# Patient Record
Sex: MALE | Race: WHITE | NOT HISPANIC OR LATINO | URBAN - METROPOLITAN AREA
[De-identification: names, ages, dates, MRNs, and addresses within clinical notes are randomized per-mention and may not be internally consistent; named-entity substitution may affect disease eponyms.]

---

## 2022-01-02 ENCOUNTER — LAB VISIT (OUTPATIENT)
Dept: PRIMARY CARE CLINIC | Facility: OTHER | Age: 27
End: 2022-01-02
Attending: INTERNAL MEDICINE
Payer: OTHER GOVERNMENT

## 2022-01-02 DIAGNOSIS — Z20.822 ENCOUNTER FOR LABORATORY TESTING FOR COVID-19 VIRUS: ICD-10-CM

## 2022-01-02 PROCEDURE — U0003 INFECTIOUS AGENT DETECTION BY NUCLEIC ACID (DNA OR RNA); SEVERE ACUTE RESPIRATORY SYNDROME CORONAVIRUS 2 (SARS-COV-2) (CORONAVIRUS DISEASE [COVID-19]), AMPLIFIED PROBE TECHNIQUE, MAKING USE OF HIGH THROUGHPUT TECHNOLOGIES AS DESCRIBED BY CMS-2020-01-R: HCPCS | Performed by: INTERNAL MEDICINE

## 2022-01-05 DIAGNOSIS — U07.1 COVID-19 VIRUS DETECTED: ICD-10-CM

## 2022-01-05 LAB
SARS-COV-2 RNA RESP QL NAA+PROBE: DETECTED
TEST PERFORMANCE INFO SPEC: ABNORMAL

## 2024-08-22 ENCOUNTER — TELEPHONE (OUTPATIENT)
Dept: FAMILY MEDICINE | Facility: CLINIC | Age: 29
End: 2024-08-22
Payer: COMMERCIAL

## 2024-08-22 NOTE — TELEPHONE ENCOUNTER
Call placed to number indicated in the attached message below.  Spoke to patient's mother (Esperanza) who states she contacted central scheduling to request an appointment for patient to establish care with a PCP.   Mrs. Mata states she was offered an appointment with a nurse practitioner, but advised she would like to schedule with a medical doctor to establish care.   Mrs. Mata stated she was then told someone will reach out to her to assist with scheduling an appointment.  Mrs. Mata states she was not told the location of the clinic that would be calling or the provider's name that patient would potentially be scheduling with.  Writer advised the message was sent to Dr. Nunez's staff for assistance with scheduling.  Advised of the address for the clinic.  Also advised patient currently does not have any insurance listed on file, would need to have insurance updated to confirm if clinic is in network with patient's benefits plan.  Advised Mrs. Mata she could call [phone] for assistance with updating insurance or can bring card to office and registration will assist with updating this information. Advised writer is on the medical side of patient care and insurance updates are handled by registration.  Advised writer does not have the ability or knowledge of how to update insurance as this is handled by registration.  Mrs. Maat advised she would call to have insurance update or drop by the office to update this information.  Conversation was witnessed by clinic supervisor Elizabeth Siegel who was able to hear both side of conversation as call was performed on speaker phone.

## 2024-08-22 NOTE — TELEPHONE ENCOUNTER
Marjorie Em Alex Staff     ----- Message from Marjorie Em sent at 8/22/2024 12:08 PM CDT -----  Regarding: sooner apt eca  Contact: pt  Type:  Sooner Appointment Request    Caller is requesting a sooner appointment.  Caller declined first available appointment listed below.  Caller will not accept being placed on the waitlist and is requesting a message be sent to doctor.    Name of Caller:  patient mom   When is the first available appointment?    Symptoms:  eca - annual   Would the patient rather a call back or a response via [facility]?   Best Call Back Number:  [phone]    Additional Information:  call to be seen thanks

## 2024-08-23 ENCOUNTER — OFFICE VISIT (OUTPATIENT)
Dept: FAMILY MEDICINE | Facility: CLINIC | Age: 29
End: 2024-08-23
Payer: COMMERCIAL

## 2024-08-23 VITALS
DIASTOLIC BLOOD PRESSURE: 80 MMHG | OXYGEN SATURATION: 98 % | SYSTOLIC BLOOD PRESSURE: 124 MMHG | HEIGHT: 73 IN | WEIGHT: 196.38 LBS | TEMPERATURE: 98 F | BODY MASS INDEX: 26.03 KG/M2 | HEART RATE: 67 BPM

## 2024-08-23 DIAGNOSIS — Z90.49 HISTORY OF APPENDECTOMY: ICD-10-CM

## 2024-08-23 DIAGNOSIS — Z72.89 ENGAGES IN VAPING: ICD-10-CM

## 2024-08-23 DIAGNOSIS — H61.23 BILATERAL IMPACTED CERUMEN: ICD-10-CM

## 2024-08-23 DIAGNOSIS — J34.2 DEVIATED SEPTUM: ICD-10-CM

## 2024-08-23 DIAGNOSIS — Z00.00 PHYSICAL EXAM: Primary | ICD-10-CM

## 2024-08-23 PROCEDURE — 99999 PR PBB SHADOW E&M-EST. PATIENT-LVL IV: CPT | Mod: PBBFAC,,, | Performed by: FAMILY MEDICINE

## 2024-08-23 NOTE — PATIENT INSTRUCTIONS
Gulshan Arvizu PA-C  [address] [facility]  [address] [place] [id number]  Phone: [phone]  Fax: [phone]

## 2024-08-24 NOTE — PROGRESS NOTES
Subjective:       Patient ID: Masood Diaz is a 29 y.o. male.    Chief Complaint: Deviated septum (Pt states over several years his family has noticed snoring has worsen  )    Here for new patient physical.      Social history quit smoking.  But vapes.  Alcohol about 6 beers per weekend.  Works as a .  Does exercise quite a bit.    Family history father skin cancer sister skin cancer uncle colon cancer mother healthy.    Immunizations tetanus diptheria shot about 2 years ago after an injury.  A cut on his wrist.    Past medical history.  Vaping.  Appendectomy at age 12.  BMI of 26.        Review of Systems   Constitutional: Negative.    HENT: Negative.          Snoring.  And possible apneas.  Septum is deviated can not breathe out of the left nostril.   Eyes: Negative.    Respiratory: Negative.     Cardiovascular: Negative.    Gastrointestinal: Negative.    Endocrine: Negative.    Genitourinary: Negative.    Musculoskeletal: Negative.    Skin: Negative.    Neurological: Negative.    Hematological: Negative.    Psychiatric/Behavioral: Negative.         Objective:      Physical Exam  Vitals reviewed.   Constitutional:       Appearance: Normal appearance. He is well-developed and normal weight.   HENT:      Head: Normocephalic and atraumatic.      Right Ear: Tympanic membrane normal. There is impacted cerumen.      Left Ear: Tympanic membrane normal. There is impacted cerumen.      Nose: Nose normal.      Comments: Significant asymmetry.  Left nostril smaller.  Obvious deviation of the septum.     Mouth/Throat:      Mouth: Mucous membranes are moist.      Pharynx: No oropharyngeal exudate.   Eyes:      Conjunctiva/sclera: Conjunctivae normal.      Pupils: Pupils are equal, round, and reactive to light.   Neck:      Vascular: No carotid bruit.   Cardiovascular:      Rate and Rhythm: Normal rate and regular rhythm.      Pulses: Normal pulses.      Heart sounds: Normal heart sounds. No murmur  heard.     No gallop.   Pulmonary:      Effort: Pulmonary effort is normal.      Breath sounds: Normal breath sounds.   Abdominal:      General: Bowel sounds are normal.      Palpations: Abdomen is soft. There is no mass.      Tenderness: There is no abdominal tenderness.   Musculoskeletal:         General: Normal range of motion.      Cervical back: Normal range of motion.   Skin:     General: Skin is warm and dry.   Neurological:      General: No focal deficit present.      Mental Status: He is alert and oriented to person, place, and time.   Psychiatric:         Mood and Affect: Mood normal.         Behavior: Behavior normal.         Assessment:       1. Physical exam    2. Deviated septum    3. BMI 26.0-26.9,adult    4. History of appendectomy    5. Engages in vaping        Plan:       Physical exam  -     CBC Auto Differential; Future; Expected date: 08/23/2024  -     Comprehensive Metabolic Panel; Future; Expected date: 08/23/2024  -     Lipid Panel; Future; Expected date: 08/23/2024    Deviated septum  -     Ambulatory referral/consult to ENT; Future; Expected date: 08/30/2024    BMI 26.0-26.9,adult    History of appendectomy    Engages in vaping    CBC CMP lipids.  Refer him to ENT regarding the nasal asymmetry.  Consider sleep study.  Ear lavage.    In addition to routine physical.  Difficulty breathing left nostril.  Septum is crooked.  Snores badly.  Possibly some witnessed apneas.  But no significant fatigue.    Physical examination.  Significant deviation of the septum to the left.  Pharynx without erythema.  Neck without adenopathy.  Chest clear.  Heart regular rate rhythm.    Impression.  Deviated septum.  Snoring.      Plan refer him to ENT see if the septal issue is the sole reason for his snoring.  If it is not then he will need a sleep study.  Also ear lavage.      Procedure note.  Bilateral cerumen impactions noted.  Patient desires removal.  Both ears lavaged clear without difficulty.  No  sequelae.  Repeat as needed.

## 2024-09-13 ENCOUNTER — OFFICE VISIT (OUTPATIENT)
Dept: OTOLARYNGOLOGY | Facility: CLINIC | Age: 29
End: 2024-09-13
Payer: COMMERCIAL

## 2024-09-13 ENCOUNTER — PATIENT MESSAGE (OUTPATIENT)
Dept: OTOLARYNGOLOGY | Facility: CLINIC | Age: 29
End: 2024-09-13

## 2024-09-13 VITALS
WEIGHT: 195.56 LBS | BODY MASS INDEX: 25.92 KG/M2 | DIASTOLIC BLOOD PRESSURE: 69 MMHG | HEART RATE: 66 BPM | SYSTOLIC BLOOD PRESSURE: 119 MMHG | HEIGHT: 73 IN

## 2024-09-13 DIAGNOSIS — J34.3 HYPERTROPHY OF INFERIOR NASAL TURBINATE: ICD-10-CM

## 2024-09-13 DIAGNOSIS — J34.2 DEVIATED SEPTUM: Primary | ICD-10-CM

## 2024-09-13 DIAGNOSIS — D22.5 MELANOCYTIC NEVUS OF TRUNK: ICD-10-CM

## 2024-09-13 PROCEDURE — 99999 PR PBB SHADOW E&M-EST. PATIENT-LVL IV: CPT | Mod: PBBFAC,,, | Performed by: OTOLARYNGOLOGY

## 2024-09-13 NOTE — H&P (VIEW-ONLY)
Subjective:       Patient ID: Masood Diaz is a 29 y.o. male.    Chief Complaint: Deviated Septum (Pt c/o issues breathing thru left nostril; has not seen ENT before regarding deviated septum.  Denies use of nasal sprays; denies allergy issues. )      This generally healthy 29-year-old presents with a long history basically lifelong of not breathing well out of the left side of his nose he snore some although he is not really here for that and does not think he has any problems sleeping does not think he has sleep apnea his never had a witnessed apnea events               Objective:      ENT Physical Exam    So this gentleman after discussing his problem with me just sort of looks up to the ceiling and points to his nose and it is perfectly obvious that it is septum presents into the midportion of his left nostril that is easily visible externally he tells me he can not breathe through this left side and that is apparent just without even looking closely why.      The cartilaginous caudal septum presents into the nostril and extends out beyond the columella.  Intranasally however the deviation is not as prominent it has a little deviated to the right and has some turbinate hypertrophy on the left    He asked me if I would also look at some moles on his back that has been brought to his attention by his family members who have medical positions     There has a small pedunculated not particularly problematic looking nevus just to the left of the midline between his shoulder blades I do not think that needs any attention he has a few such lesions however further down at roughly the level of T2 or T3 near the midline he has a about a 4 mm very dark pigmented nevus that I do think warrants a biopsy now that is been brought to my attention        Assessment:       1. Deviated septum    2. Hypertrophy of inferior nasal turbinate    3. Melanocytic nevus of trunk         Plan:          We discussed the options to address  this problem the nature of this deviation is not going to respond to medications that is mechanical right at the nostril and we discuss septoplasty and turbinate reduction to help with his airway, it may or may not help with the snoring and he does not think he has sleep apnea and we discussed roughly the anticipated recovery.  I have also offered to biopsy this slightly suspicious nevus on his back and I have mentioned to him that I had biopsied and a fashion that should the final pathology prove to be a melanoma it would end up needing more aggressive removal in his 2nd sitting.

## 2024-09-24 ENCOUNTER — ANESTHESIA EVENT (OUTPATIENT)
Dept: SURGERY | Facility: HOSPITAL | Age: 29
End: 2024-09-24
Payer: COMMERCIAL

## 2024-09-27 ENCOUNTER — ANESTHESIA (OUTPATIENT)
Dept: SURGERY | Facility: HOSPITAL | Age: 29
End: 2024-09-27
Payer: COMMERCIAL

## 2024-09-27 ENCOUNTER — HOSPITAL ENCOUNTER (OUTPATIENT)
Facility: HOSPITAL | Age: 29
Discharge: HOME | End: 2024-09-27
Attending: OTOLARYNGOLOGY | Admitting: OTOLARYNGOLOGY
Payer: COMMERCIAL

## 2024-09-27 PROCEDURE — 36000709 HC OR TIME LEV III EA ADD 15 MIN: Performed by: OTOLARYNGOLOGY

## 2024-09-27 PROCEDURE — 63600175 PHARM REV CODE 636 W HCPCS: Performed by: ANESTHESIOLOGY

## 2024-09-27 PROCEDURE — 25000003 PHARM REV CODE 250: Performed by: ANESTHESIOLOGY

## 2024-09-27 PROCEDURE — 25000003 PHARM REV CODE 250: Performed by: NURSE ANESTHETIST, CERTIFIED REGISTERED

## 2024-09-27 PROCEDURE — 27201423 OPTIME MED/SURG SUP & DEVICES STERILE SUPPLY: Performed by: OTOLARYNGOLOGY

## 2024-09-27 PROCEDURE — 71000039 HC RECOVERY, EACH ADD'L HOUR: Performed by: OTOLARYNGOLOGY

## 2024-09-27 PROCEDURE — 63600175 PHARM REV CODE 636 W HCPCS: Performed by: NURSE ANESTHETIST, CERTIFIED REGISTERED

## 2024-09-27 PROCEDURE — 11402 EXC TR-EXT B9+MARG 1.1-2 CM: CPT | Mod: 51,,, | Performed by: OTOLARYNGOLOGY

## 2024-09-27 PROCEDURE — 30802 ABLATE INF TURBINATE SUBMUC: CPT | Mod: 51,,, | Performed by: OTOLARYNGOLOGY

## 2024-09-27 PROCEDURE — 25000003 PHARM REV CODE 250: Performed by: OTOLARYNGOLOGY

## 2024-09-27 PROCEDURE — 37000009 HC ANESTHESIA EA ADD 15 MINS: Performed by: OTOLARYNGOLOGY

## 2024-09-27 PROCEDURE — 71000033 HC RECOVERY, INTIAL HOUR: Performed by: OTOLARYNGOLOGY

## 2024-09-27 PROCEDURE — 36000708 HC OR TIME LEV III 1ST 15 MIN: Performed by: OTOLARYNGOLOGY

## 2024-09-27 PROCEDURE — 30520 REPAIR OF NASAL SEPTUM: CPT | Mod: ,,, | Performed by: OTOLARYNGOLOGY

## 2024-09-27 PROCEDURE — 37000008 HC ANESTHESIA 1ST 15 MINUTES: Performed by: OTOLARYNGOLOGY

## 2024-09-27 RX ORDER — ROCURONIUM BROMIDE 10 MG/ML
INJECTION, SOLUTION INTRAVENOUS
Status: DISCONTINUED | OUTPATIENT
Start: 2024-09-27 | End: 2024-09-27

## 2024-09-27 RX ORDER — OXYCODONE HYDROCHLORIDE 5 MG/1
5 TABLET ORAL EVERY 4 HOURS PRN
Qty: 18 TABLET | Refills: 0 | Status: SHIPPED | OUTPATIENT
Start: 2024-09-27 | End: 2024-10-04

## 2024-09-27 RX ORDER — FENTANYL CITRATE 50 UG/ML
25 INJECTION, SOLUTION INTRAMUSCULAR; INTRAVENOUS EVERY 5 MIN PRN
Status: DISCONTINUED | OUTPATIENT
Start: 2024-09-27 | End: 2024-09-27 | Stop reason: HOSPADM

## 2024-09-27 RX ORDER — SODIUM CHLORIDE, SODIUM LACTATE, POTASSIUM CHLORIDE, CALCIUM CHLORIDE 600; 310; 30; 20 MG/100ML; MG/100ML; MG/100ML; MG/100ML
125 INJECTION, SOLUTION INTRAVENOUS CONTINUOUS
Status: DISCONTINUED | OUTPATIENT
Start: 2024-09-27 | End: 2024-09-27 | Stop reason: HOSPADM

## 2024-09-27 RX ORDER — LIDOCAINE HYDROCHLORIDE AND EPINEPHRINE 10; 10 MG/ML; UG/ML
INJECTION, SOLUTION INFILTRATION; PERINEURAL
Status: DISCONTINUED | OUTPATIENT
Start: 2024-09-27 | End: 2024-09-27 | Stop reason: HOSPADM

## 2024-09-27 RX ORDER — SULFAMETHOXAZOLE AND TRIMETHOPRIM 800; 160 MG/1; MG/1
1 TABLET ORAL 2 TIMES DAILY
Qty: 14 TABLET | Refills: 0 | Status: SHIPPED | OUTPATIENT
Start: 2024-09-27 | End: 2024-10-04

## 2024-09-27 RX ORDER — PROPOFOL 10 MG/ML
VIAL (ML) INTRAVENOUS
Status: DISCONTINUED | OUTPATIENT
Start: 2024-09-27 | End: 2024-09-27

## 2024-09-27 RX ORDER — FENTANYL CITRATE 50 UG/ML
INJECTION, SOLUTION INTRAMUSCULAR; INTRAVENOUS
Status: DISCONTINUED | OUTPATIENT
Start: 2024-09-27 | End: 2024-09-27

## 2024-09-27 RX ORDER — MIDAZOLAM HYDROCHLORIDE 1 MG/ML
INJECTION INTRAMUSCULAR; INTRAVENOUS
Status: DISCONTINUED | OUTPATIENT
Start: 2024-09-27 | End: 2024-09-27

## 2024-09-27 RX ORDER — MUPIROCIN 20 MG/G
OINTMENT TOPICAL
Status: DISCONTINUED | OUTPATIENT
Start: 2024-09-27 | End: 2024-09-27 | Stop reason: HOSPADM

## 2024-09-27 RX ORDER — ONDANSETRON HYDROCHLORIDE 2 MG/ML
INJECTION, SOLUTION INTRAVENOUS
Status: DISCONTINUED | OUTPATIENT
Start: 2024-09-27 | End: 2024-09-27

## 2024-09-27 RX ORDER — PHENYLEPHRINE HYDROCHLORIDE 10 MG/ML
INJECTION INTRAVENOUS
Status: DISCONTINUED | OUTPATIENT
Start: 2024-09-27 | End: 2024-09-27

## 2024-09-27 RX ORDER — SODIUM CHLORIDE, SODIUM LACTATE, POTASSIUM CHLORIDE, CALCIUM CHLORIDE 600; 310; 30; 20 MG/100ML; MG/100ML; MG/100ML; MG/100ML
INJECTION, SOLUTION INTRAVENOUS CONTINUOUS
Status: DISCONTINUED | OUTPATIENT
Start: 2024-09-27 | End: 2024-09-27 | Stop reason: HOSPADM

## 2024-09-27 RX ORDER — LIDOCAINE HYDROCHLORIDE 10 MG/ML
1 INJECTION, SOLUTION EPIDURAL; INFILTRATION; INTRACAUDAL; PERINEURAL ONCE
Status: COMPLETED | OUTPATIENT
Start: 2024-09-27 | End: 2024-09-27

## 2024-09-27 RX ORDER — ONDANSETRON HYDROCHLORIDE 2 MG/ML
4 INJECTION, SOLUTION INTRAVENOUS ONCE AS NEEDED
Status: DISCONTINUED | OUTPATIENT
Start: 2024-09-27 | End: 2024-09-27 | Stop reason: HOSPADM

## 2024-09-27 RX ORDER — LIDOCAINE HYDROCHLORIDE 20 MG/ML
INJECTION INTRAVENOUS
Status: DISCONTINUED | OUTPATIENT
Start: 2024-09-27 | End: 2024-09-27

## 2024-09-27 RX ORDER — DEXAMETHASONE SODIUM PHOSPHATE 4 MG/ML
INJECTION, SOLUTION INTRA-ARTICULAR; INTRALESIONAL; INTRAMUSCULAR; INTRAVENOUS; SOFT TISSUE
Status: DISCONTINUED | OUTPATIENT
Start: 2024-09-27 | End: 2024-09-27

## 2024-09-27 RX ORDER — EPHEDRINE SULFATE 50 MG/ML
INJECTION, SOLUTION INTRAVENOUS
Status: DISCONTINUED | OUTPATIENT
Start: 2024-09-27 | End: 2024-09-27

## 2024-09-27 RX ORDER — ACETAMINOPHEN 10 MG/ML
INJECTION, SOLUTION INTRAVENOUS
Status: DISCONTINUED | OUTPATIENT
Start: 2024-09-27 | End: 2024-09-27

## 2024-09-27 RX ORDER — MUPIROCIN 20 MG/G
OINTMENT TOPICAL 3 TIMES DAILY
Qty: 15 G | Refills: 0 | Status: SHIPPED | OUTPATIENT
Start: 2024-09-27 | End: 2024-10-04

## 2024-09-27 RX ORDER — OXYCODONE HYDROCHLORIDE 5 MG/1
5 TABLET ORAL ONCE AS NEEDED
Status: COMPLETED | OUTPATIENT
Start: 2024-09-27 | End: 2024-09-27

## 2024-09-27 RX ORDER — OXYMETAZOLINE HCL 0.05 %
SPRAY, NON-AEROSOL (ML) NASAL
Status: DISCONTINUED | OUTPATIENT
Start: 2024-09-27 | End: 2024-09-27 | Stop reason: HOSPADM

## 2024-09-27 RX ORDER — SUCCINYLCHOLINE CHLORIDE 20 MG/ML
INJECTION INTRAMUSCULAR; INTRAVENOUS
Status: DISCONTINUED | OUTPATIENT
Start: 2024-09-27 | End: 2024-09-27

## 2024-09-27 RX ADMIN — FENTANYL CITRATE 50 MCG: 50 INJECTION, SOLUTION INTRAMUSCULAR; INTRAVENOUS at 09:09

## 2024-09-27 RX ADMIN — ROCURONIUM BROMIDE 10 MG: 10 INJECTION, SOLUTION INTRAVENOUS at 09:09

## 2024-09-27 RX ADMIN — LIDOCAINE HYDROCHLORIDE 100 MG: 20 INJECTION, SOLUTION INTRAVENOUS at 09:09

## 2024-09-27 RX ADMIN — SODIUM CHLORIDE, POTASSIUM CHLORIDE, SODIUM LACTATE AND CALCIUM CHLORIDE: 600; 310; 30; 20 INJECTION, SOLUTION INTRAVENOUS at 09:09

## 2024-09-27 RX ADMIN — MIDAZOLAM 2 MG: 1 INJECTION INTRAMUSCULAR; INTRAVENOUS at 09:09

## 2024-09-27 RX ADMIN — SODIUM CHLORIDE, POTASSIUM CHLORIDE, SODIUM LACTATE AND CALCIUM CHLORIDE: 600; 310; 30; 20 INJECTION, SOLUTION INTRAVENOUS at 10:09

## 2024-09-27 RX ADMIN — OXYCODONE HYDROCHLORIDE 5 MG: 5 TABLET ORAL at 11:09

## 2024-09-27 RX ADMIN — PHENYLEPHRINE HYDROCHLORIDE 100 MCG: 10 INJECTION, SOLUTION INTRAMUSCULAR; INTRAVENOUS; SUBCUTANEOUS at 09:09

## 2024-09-27 RX ADMIN — EPHEDRINE SULFATE 10 MG: 50 INJECTION, SOLUTION INTRAMUSCULAR; INTRAVENOUS; SUBCUTANEOUS at 09:09

## 2024-09-27 RX ADMIN — ACETAMINOPHEN 1000 MG: 10 INJECTION INTRAVENOUS at 09:09

## 2024-09-27 RX ADMIN — GLYCOPYRROLATE 0.2 MG: 0.2 INJECTION, SOLUTION INTRAMUSCULAR; INTRAVITREAL at 09:09

## 2024-09-27 RX ADMIN — LIDOCAINE HYDROCHLORIDE 0.2 MG: 10 SOLUTION INTRAVENOUS at 08:09

## 2024-09-27 RX ADMIN — ONDANSETRON 4 MG: 2 INJECTION, SOLUTION INTRAMUSCULAR; INTRAVENOUS at 09:09

## 2024-09-27 RX ADMIN — SUCCINYLCHOLINE CHLORIDE 140 MG: 20 INJECTION, SOLUTION INTRAMUSCULAR; INTRAVENOUS; PARENTERAL at 09:09

## 2024-09-27 RX ADMIN — FENTANYL CITRATE 25 MCG: 50 INJECTION, SOLUTION INTRAMUSCULAR; INTRAVENOUS at 10:09

## 2024-09-27 RX ADMIN — PROPOFOL 200 MG: 10 INJECTION, EMULSION INTRAVENOUS at 09:09

## 2024-09-27 RX ADMIN — SODIUM CHLORIDE, POTASSIUM CHLORIDE, SODIUM LACTATE AND CALCIUM CHLORIDE 1000 ML: 600; 310; 30; 20 INJECTION, SOLUTION INTRAVENOUS at 08:09

## 2024-09-27 RX ADMIN — DEXAMETHASONE SODIUM PHOSPHATE 12 MG: 4 INJECTION, SOLUTION INTRAMUSCULAR; INTRAVENOUS at 09:09

## 2024-09-27 NOTE — INTERVAL H&P NOTE
The patient has been examined and the H&P has been reviewed:    I concur with the findings and no changes have occurred since H&P was written.    In addition to the findings on the H and P that is being updated, I have listened to his heart and lungs this morning and there are no obvious abnormalities and he does not have a history of any lung or heart issues.    Surgery risks, benefits and alternative options discussed and understood by patient/family.          There are no hospital problems to display for this patient.

## 2024-09-27 NOTE — PLAN OF CARE
Dr Encinas spoke with patient and father post procedure. His office will call patient next week to determine when post op appointment will be. Patient will be sent home with additional gauze and tape.

## 2024-09-27 NOTE — ANESTHESIA PROCEDURE NOTES
Intubation    Date/Time: 9/27/2024 9:24 AM    Performed by: Daniel Campos CRNA  Authorized by: Donny Alcaraz MD    Intubation:     Induction:  Intravenous    Intubated:  Postinduction    Mask Ventilation:  Easy mask    Attempts:  1    Attempted By:  CRNA    Method of Intubation:  Direct    Blade:  Gudino 4    Laryngeal View Grade: Grade I - full view of cords      Difficult Airway Encountered?: No      Complications:  None    Airway Device:  Oral kaden and oral endotracheal tube    Airway Device Size:  7.5    Style/Cuff Inflation:  Cuffed (inflated to minimal occlusive pressure)    Inflation Amount (mL):  5    Tube secured:  21    Secured at:  The lips    Placement Verified By:  Capnometry    Complicating Factors:  None    Findings Post-Intubation:  BS equal bilateral and atraumatic/condition of teeth unchanged

## 2024-09-27 NOTE — TRANSFER OF CARE
"Anesthesia Transfer of Care Note    Patient: Masood Diaz    Procedure(s) Performed: Procedure(s) (LRB):  FESS, WITH NASAL SEPTOPLASTY AND TURBINATE REDUCTION (N/A)  BIOPSY, LESION (N/A)    Patient location: PACU    Anesthesia Type: general    Transport from OR: Transported from OR on room air with adequate spontaneous ventilation    Post pain: adequate analgesia    Post assessment: no apparent anesthetic complications and tolerated procedure well    Post vital signs: stable    Level of consciousness: awake and sedated    Nausea/Vomiting: no nausea/vomiting    Complications: none    Transfer of care protocol was followed      Last vitals: Visit Vitals  BP (!) 110/59   Pulse 60   Temp 36.2 °C (97.2 °F) (Skin)   Resp 18   Ht 6' 1" (1.854 m)   Wt 88.7 kg (195 lb 8.8 oz)   SpO2 97%   BMI 25.80 kg/m²     "

## 2024-09-27 NOTE — OP NOTE
Operative report:     Date of surgery: 09/27/2024    Surgeon: Ce     Procedure: Septoplasty turbinate reduction biopsy of pigmented nevus     Indications: This 29-year-old has a very long history of nasal airway obstruction when we 1st met in the clinic he was able to demonstrate just from a distance his condition when he looks up which towards the ceiling you can see his septum that occupies most of the left nostril externally.  It extends beyond the margin of the nostril itself.  He is unsure about any specific injury that led to this and then on intranasal exam he had a curvature with the convexity to the right so both sides were obstructed from the septal deviation     He also asked me about some skin lesions that family members have noticed and while he has many nevus on his back that is one in the midline that is relatively dark and has some irregular borders that we chose to biopsy while he was under anesthesia    Procedure:  The patient was taken to the operating room general endotracheal anesthesia was administered without difficulty he was briefly rolled on his side and positioned his back was prepped and draped in sterile fashion and the pigmented nevus which was a proximally 1.4 cm in greatest dimension was resected in an elliptical fashion with a margin that would not be suitable for a melanoma but would be suitable for a dysplastic nevus or for biopsy purposes the lesion was closed with 4-0 Vicryl and 4-0 nylon and a large bandage was applied     He was then repositioned in anticipation of sinus and nasal surgery his face was prepped and draped in sterile fashion and using a headlight his nose was examined.  Afrin-soaked pledgets were placed in the nasal cavity bilaterally the anterior septum which extended beyond the nostrils was injected with 1% lidocaine and epinephrine and hydro dissection was used to elevate the flaps on both sides     Once adequate time that is been allowed for the Afrin to  work the pledgets were removed a left yuri transfixation incision was made over the vertex of the exposed septum and dissection along both the right and left mucoperichondrial plane provided good access to the cartilaginous and bony abnormalities.  The cartilage had a spring like curvature and extended well beyond the maxillary crest the maxillary crest was prominently deviated and has a large wide characteristic anteriorly.  The bony deviation was to the right posteriorly once it was access the bony deflections of the bony septum were resected.  The maxillary crest was then partially removed using a 2 mm osteotome to provide room for the cartilaginous deflection to fit there was scar tissue between the right lower lateral cartilage medial tamera and the septum mucoperichondrial plane there was relatively dense and has been filling in this space between the deviated septum and the nasal airway on the right.  That was debulked to some extent using iris scissors and a pocket was made between the medial tamera of the lower lateral cartilage a portion of the nasal spine which was deviated to the left was resected using a 2 mm osteotome.      The cartilage had spring like tendency and prior to resecting it or morselizing it multiple relaxing incisions were made and that did tend to relax the cartilage and a way that it would stay in the midline without compromising its support.  It was sutured to the midline using a 3-0 chromic suture and then the mucoperichondrial flaps were reapposed using multiple simple interrupted 4-0 plain gut suture.  Earl splints were placed despite the cartilage and septum seeming in very good position in anticipation of the septum attempting to retain that is previous curvature in the postoperative.  There were enough relax incisions that I believe this will heal into the midline position.      After the Earl splints were applied a 0 degree scope was used to examine the turbinate which were  outfractured and the Coblator device was used to create multiple submucosal passes under each inferior turbinate especially the head of the turbinate and the length of the turbinate.    There was minimal bleeding throughout the procedure less than 10 cc total mupirocin was then applied anteriorly to the area beneath the Earl splint and of the nasal sill.  There was some tension of the skin of the left nasal sill because of the long term pre-existing septal deviation and that was tending to tent up so a relaxing incision was made of the skin of the floor of the nose in the location midway between the septum and the piriform aperture the Earl splint fell medial to this incision.    Patient tolerated this procedure well was awakened from general anesthesia discharged to the recovery room in stable condition

## 2024-09-27 NOTE — DISCHARGE SUMMARY
[facility] - Periop Services  Discharge Note  Short Stay    Procedure(s) (LRB):  FESS, WITH NASAL SEPTOPLASTY AND TURBINATE REDUCTION (N/A)  BIOPSY, LESION (N/A)      OUTCOME: Patient tolerated treatment/procedure well without complication and is now ready for discharge.    DISPOSITION: Home or Self Care    FINAL DIAGNOSIS:  deviated nasal septum, pigmented nevus    FOLLOWUP: In clinic    DISCHARGE INSTRUCTIONS:  No discharge procedures on file.     TIME SPENT ON DISCHARGE: 3 minutes

## 2024-09-27 NOTE — ANESTHESIA PREPROCEDURE EVALUATION
09/27/2024  Masood Diaz is a 29 y.o., male.      Pre-op Assessment    I have reviewed the Patient Summary Reports.     I have reviewed the Nursing Notes. I have reviewed the NPO Status.   I have reviewed the Medications.     Review of Systems  Social:  Vaping       EENT/Dental:   Deviated septum          Cardiovascular:  Cardiovascular Normal                                            Pulmonary:  Pulmonary Normal                       Renal/:  Renal/ Normal                 Hepatic/GI:  Hepatic/GI Normal                 Neurological:  Neurology Normal                                      Endocrine:  Endocrine Normal            Dermatological:  Nevus on back   Psych:   anxiety                 Physical Exam  General: Well nourished    Airway:  Mallampati: II   Neck ROM: Normal ROM    Dental:  Intact    Chest/Lungs:  Clear to auscultation, Normal Respiratory Rate    Heart:  Rate: Normal  Rhythm: Regular Rhythm        Anesthesia Plan  Type of Anesthesia, risks & benefits discussed:    Anesthesia Type: Gen ETT  Intra-op Monitoring Plan: Standard ASA Monitors  Post Op Pain Control Plan: multimodal analgesia and IV/PO Opioids PRN  Induction:  IV and Inhalation  Informed Consent: Informed consent signed with the Patient and all parties understand the risks and agree with anesthesia plan.  All questions answered.   ASA Score: 1    Ready For Surgery From Anesthesia Perspective.     .

## 2024-09-30 VITALS
DIASTOLIC BLOOD PRESSURE: 76 MMHG | HEIGHT: 73 IN | BODY MASS INDEX: 25.92 KG/M2 | RESPIRATION RATE: 19 BRPM | SYSTOLIC BLOOD PRESSURE: 111 MMHG | OXYGEN SATURATION: 96 % | TEMPERATURE: 97 F | WEIGHT: 195.56 LBS | HEART RATE: 56 BPM

## 2024-09-30 NOTE — ANESTHESIA POSTPROCEDURE EVALUATION
Anesthesia Post Evaluation    Patient: Masood Diaz    Procedure(s) Performed: Procedure(s) (LRB):  FESS, WITH NASAL SEPTOPLASTY AND TURBINATE REDUCTION (N/A)  BIOPSY, LESION (N/A)    Final Anesthesia Type: general      Patient location during evaluation: PACU  Patient participation: Yes- Able to Participate  Level of consciousness: sedated and awake  Post-procedure vital signs: reviewed and stable  Pain management: adequate  Airway patency: patent    PONV status at discharge: No PONV  Anesthetic complications: no      Cardiovascular status: blood pressure returned to baseline and hemodynamically stable  Respiratory status: spontaneous ventilation  Hydration status: euvolemic  Follow-up not needed.              Vitals Value Taken Time   /76 09/27/24 1215   Temp 36.2 °C (97.2 °F) 09/27/24 1110   Pulse 56 09/27/24 1215   Resp 19 09/27/24 1215   SpO2 96 % 09/27/24 1215         Event Time   Out of Recovery 12:15:00         Pain/Jessica Score: No data recorded

## 2024-10-04 ENCOUNTER — TELEPHONE (OUTPATIENT)
Dept: OTOLARYNGOLOGY | Facility: CLINIC | Age: 29
End: 2024-10-04

## 2024-10-04 ENCOUNTER — OFFICE VISIT (OUTPATIENT)
Dept: OTOLARYNGOLOGY | Facility: CLINIC | Age: 29
End: 2024-10-04
Payer: COMMERCIAL

## 2024-10-04 VITALS — HEIGHT: 73 IN | BODY MASS INDEX: 25.8 KG/M2 | WEIGHT: 194.69 LBS

## 2024-10-04 DIAGNOSIS — J34.2 DEVIATED SEPTUM: Primary | ICD-10-CM

## 2024-10-04 PROCEDURE — 99999 PR PBB SHADOW E&M-EST. PATIENT-LVL III: CPT | Mod: PBBFAC,,, | Performed by: OTOLARYNGOLOGY

## 2024-10-04 NOTE — PROGRESS NOTES
Subjective:       Patient ID: Masood Diaz is a 29 y.o. male.    Chief Complaint: Post-op Evaluation (FESS, WITH NASAL SEPTOPLASTY AND TURBINATE REDUCTION - 09/27/2024 / splint removal)      This 29-year-old is six days postop from a septoplasty and a Coblator turbinate reduction and he is here to have the Earl splints removed the septal deviation was dramatic to the left caudally               Objective:      ENT Physical Exam    The Earl splints were removed and he has a lot of room now I cleaned some of the exudate that typically for him that from the Coblator.  The caudal septum that I placed in a pocket between the lower lateral cartilages may have shifted a little bit or at least that is pushing the lower lateral medial taemra over a bit but that is so much better than before and I am going to let this heal for a couple of weeks see him back if something changes I have given him some thoughts on applying mupirocin and being gentle with the blows and if that tiny little bit of cartilage still is presenting if in fact that is present we can potentially resected in the office and still not lose support of his dorsum or tip.          Assessment:  Turbinate reduction.         1. Deviated septum         Plan:          I am going to see him in two weeks and he is going to continue to apply mupirocin to his nostrils especially on the left side with the incision was made        
<<-----Click on this checkbox to enter Pre-Op Dx

## 2024-10-04 NOTE — TELEPHONE ENCOUNTER
1st attempt to contact pt per Dr. Encinas to see about scheduling ENT appt today.  No answer; unable to leave VM for call back.  Pt portal msg to be sent to pt due to failed call attempt.

## 2024-10-07 NOTE — TELEPHONE ENCOUNTER
Pt arrived to ENT appt in PM on 10/04 to see Dr. Encinas for splint removal.  Encounter signed/closed.

## (undated) DEVICE — SPONGE PATTY SURGICAL .5X3IN

## (undated) DEVICE — SUT 5/0 18IN PROLENE BL MO

## (undated) DEVICE — SCRUB HIBICLENS 4% CHG 4OZ

## (undated) DEVICE — SUT CHROMIC 3-0 PS2 27IN BR

## (undated) DEVICE — BLADE TRICUT ROTATABLE 4MM

## (undated) DEVICE — HANDPIECE REFLUX ULTRA 45

## (undated) DEVICE — SPONGE GAUZE 16PLY 4X4

## (undated) DEVICE — PAD N ADH STRL 2X3IN

## (undated) DEVICE — Device

## (undated) DEVICE — SYR B-D DISP CONTROL 10CC100/C

## (undated) DEVICE — SUT PLAIN 4-0 SC-1 18IN

## (undated) DEVICE — SPLINT NASAL AIRWAY SEPTAL SIL